# Patient Record
(demographics unavailable — no encounter records)

---

## 2024-12-04 NOTE — DEVELOPMENTAL MILESTONES
[Normal Development] : Normal Development [Goes to the bathroom and urinates] : does not go to bathroom and urinates by self [Plays and shares with others] : plays and shares with others [Put on coat, jacket, or shirt by self] : puts on coat, jacket, or shirt by self [Eats independently] : eats independently [Uses 3-word sentences] : uses 3-word sentences [Uses words that are 75% intelligible] : uses words that are 75% intelligible to strangers [Understands simple prepositions] : understands simple prepositions [Compares things using words such] : compares things using words such as bigger or shorter [Climbs on and off couch] : climbs on and off couch or chair [Jumps forward] : jumps forward

## 2024-12-04 NOTE — DISCUSSION/SUMMARY
[] : The components of the vaccine(s) to be administered today are listed in the plan of care. The disease(s) for which the vaccine(s) are intended to prevent and the risks have been discussed with the caretaker.  The risks are also included in the appropriate vaccination information statements which have been provided to the patient's caregiver.  The caregiver has given consent to vaccinate. [FreeTextEntry1] : Pt presenting for 3y WCC, growing and developing well. Continue balanced diet with all food groups. Brush teeth twice a day with toothbrush. Recommend visit to dentist. As per car seat 's guidelines, use forward-facing car seat in back seat of car. Switch to booster seat when child reaches highest weight/height for seat. Child needs to ride in a belt-positioning booster seat until  4 feet 9 inches has been reached and are between 8 and 12 years of age. Put toddler to sleep in own bed. Help toddler to maintain consistent daily routines and sleep schedule. Pre-K discussed. Ensure home is safe. Use consistent, positive discipline. Read aloud to toddler. Limit screen time to no more than 2 hours per day.  Received flu vaccine today. Deferring lead screen as last two levels were similar and there have been no increased exposures. Return in 1 year for 4y WCC.

## 2024-12-04 NOTE — PHYSICAL EXAM

## 2024-12-04 NOTE — HISTORY OF PRESENT ILLNESS
[Parents] : parents [Meat] : meat [Eggs] : eggs [Dairy] : dairy [Vitamin] : Patient takes vitamin daily [___ stools every other day] : [unfilled]  stools every other day [Normal] : Normal [Brushing teeth] : Brushing teeth [Yes] : Patient goes to dentist yearly [Toothpaste] : Primary Fluoride Source: Toothpaste [In nursery school] : In nursery school [Playtime (60 min/d)] : Playtime 60 min a day [Appropiate parent-child communication] : Appropriate parent-child communication [Child Cooperates] : Child cooperates [Up to date] : Up to date [Water heater temperature set at <120 degrees F] : Water heater temperature set at <120 degrees F [Car seat in back seat] : Car seat in back seat [Smoke Detectors] : Smoke detectors [Carbon Monoxide Detectors] : Carbon monoxide detectors [de-identified] : Recent illness with lingering cough, worse overnight and in the morning.